# Patient Record
Sex: MALE
[De-identification: names, ages, dates, MRNs, and addresses within clinical notes are randomized per-mention and may not be internally consistent; named-entity substitution may affect disease eponyms.]

---

## 2018-04-26 ENCOUNTER — HOSPITAL ENCOUNTER (OUTPATIENT)
Dept: HOSPITAL 42 - ED | Age: 68
End: 2018-04-26
Payer: MEDICARE

## 2018-04-26 VITALS — OXYGEN SATURATION: 98 % | SYSTOLIC BLOOD PRESSURE: 161 MMHG | HEART RATE: 115 BPM | DIASTOLIC BLOOD PRESSURE: 100 MMHG

## 2018-04-26 VITALS — TEMPERATURE: 98.2 F | RESPIRATION RATE: 18 BRPM

## 2018-04-26 VITALS — BODY MASS INDEX: 27.6 KG/M2

## 2018-04-26 DIAGNOSIS — I25.10: ICD-10-CM

## 2018-04-26 DIAGNOSIS — I21.09: Primary | ICD-10-CM

## 2018-04-26 DIAGNOSIS — E78.5: ICD-10-CM

## 2018-04-26 DIAGNOSIS — Z79.84: ICD-10-CM
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
## 2018-04-26 DIAGNOSIS — Z87.442: ICD-10-CM

## 2018-04-26 DIAGNOSIS — Z87.891: ICD-10-CM

## 2018-04-26 DIAGNOSIS — E11.9: ICD-10-CM

## 2018-04-26 DIAGNOSIS — I97.710: ICD-10-CM

## 2018-04-26 DIAGNOSIS — I10: ICD-10-CM

## 2018-04-26 DIAGNOSIS — R06.6: ICD-10-CM

## 2018-04-26 LAB
ALBUMIN SERPL-MCNC: 4.1 G/DL (ref 3–4.8)
ALBUMIN/GLOB SERPL: 1.1 {RATIO} (ref 1.1–1.8)
ALT SERPL-CCNC: 38 U/L (ref 7–56)
APTT BLD: 34.1 SECONDS (ref 25.1–36.5)
AST SERPL-CCNC: 157 U/L (ref 17–59)
BASOPHILS # BLD AUTO: 0.02 K/MM3 (ref 0–2)
BASOPHILS NFR BLD: 0.1 % (ref 0–3)
BUN SERPL-MCNC: 28 MG/DL (ref 7–21)
CALCIUM SERPL-MCNC: 9.2 MG/DL (ref 8.4–10.5)
CK MB SERPL-MCNC: 12.3 NG/ML (ref 0–3.6)
CK MB%: 3 % (ref 2.5–3)
EOSINOPHIL # BLD: 0 10*3/UL (ref 0–0.7)
EOSINOPHIL NFR BLD: 0.2 % (ref 1.5–5)
ERYTHROCYTE [DISTWIDTH] IN BLOOD BY AUTOMATED COUNT: 15.9 % (ref 11.5–14.5)
GFR NON-AFRICAN AMERICAN: 43
GRANULOCYTES # BLD: 11.28 10*3/UL (ref 1.4–6.5)
GRANULOCYTES NFR BLD: 82.7 % (ref 50–68)
HGB BLD-MCNC: 14.3 G/DL (ref 14–18)
INR PPP: 1.07 (ref 0.93–1.08)
LYMPHOCYTES # BLD: 1.2 10*3/UL (ref 1.2–3.4)
LYMPHOCYTES NFR BLD AUTO: 8.6 % (ref 22–35)
MCH RBC QN AUTO: 25.7 PG (ref 25–35)
MCHC RBC AUTO-ENTMCNC: 32.9 G/DL (ref 31–37)
MCV RBC AUTO: 78.2 FL (ref 80–105)
MONOCYTES # BLD AUTO: 1.1 10*3/UL (ref 0.1–0.6)
MONOCYTES NFR BLD: 8.4 % (ref 1–6)
PLATELET # BLD: 182 10^3/UL (ref 120–450)
PMV BLD AUTO: 9 FL (ref 7–11)
PROTHROMBIN TIME: 12.3 SECONDS (ref 9.4–12.5)
RBC # BLD AUTO: 5.56 10^6/UL (ref 3.5–6.1)
TROPONIN I SERPL-MCNC: 36.4 NG/ML
WBC # BLD AUTO: 13.7 10^3/UL (ref 4.5–11)

## 2018-04-26 PROCEDURE — 85175 BLOOD CLOT LYSIS TIME: CPT

## 2018-04-26 PROCEDURE — 82550 ASSAY OF CK (CPK): CPT

## 2018-04-26 PROCEDURE — 93454 CORONARY ARTERY ANGIO S&I: CPT

## 2018-04-26 PROCEDURE — 31500 INSERT EMERGENCY AIRWAY: CPT

## 2018-04-26 PROCEDURE — 82553 CREATINE MB FRACTION: CPT

## 2018-04-26 PROCEDURE — 84484 ASSAY OF TROPONIN QUANT: CPT

## 2018-04-26 PROCEDURE — 85025 COMPLETE CBC W/AUTO DIFF WBC: CPT

## 2018-04-26 PROCEDURE — 92950 HEART/LUNG RESUSCITATION CPR: CPT

## 2018-04-26 PROCEDURE — 83615 LACTATE (LD) (LDH) ENZYME: CPT

## 2018-04-26 PROCEDURE — 93005 ELECTROCARDIOGRAM TRACING: CPT

## 2018-04-26 PROCEDURE — 99153 MOD SED SAME PHYS/QHP EA: CPT

## 2018-04-26 PROCEDURE — 96375 TX/PRO/DX INJ NEW DRUG ADDON: CPT

## 2018-04-26 PROCEDURE — 85610 PROTHROMBIN TIME: CPT

## 2018-04-26 PROCEDURE — 71045 X-RAY EXAM CHEST 1 VIEW: CPT

## 2018-04-26 PROCEDURE — 96374 THER/PROPH/DIAG INJ IV PUSH: CPT

## 2018-04-26 PROCEDURE — 99285 EMERGENCY DEPT VISIT HI MDM: CPT

## 2018-04-26 PROCEDURE — 33967 INSERT I-AORT PERCUT DEVICE: CPT

## 2018-04-26 PROCEDURE — 85730 THROMBOPLASTIN TIME PARTIAL: CPT

## 2018-04-26 PROCEDURE — 99152 MOD SED SAME PHYS/QHP 5/>YRS: CPT

## 2018-04-26 PROCEDURE — 80053 COMPREHEN METABOLIC PANEL: CPT

## 2018-04-26 NOTE — PCM.RRT
RRT Nurse Assessment





- Situation


Date: 04/26/18


Time RRT was called: 07:19 (Code Blue)


RRT Responder Arrival Time: 07:19


RRT Location:: Cath Lab


RRT Called By: RN





- IV


IV Inserted during RRT?: No


IV Fluids Initiated During RRT?: Already running IVF. It was made wide open





- Respiratory


Oxygen Delivery Method: Non Rebreather @%


Oxygen Flow Rate: 100


Was the Patient Ventilated with Bag/Mask 100% O2?: Yes


Secretions Suctioned?: Yes


Was the Patient Intubated?: No (Difficult intubation. Dr Mackay and 

Anethesiologist assisted)


Was the Patient Placed on a Ventilator?: No





- Diagnostic Test Ordered


EKG: Yes (Anterior-inferior infarction, ST elevations in V3-V5, II, III, and aVF

)


CPR started during RRT?: Yes





- Salmon Coma Scale


Coma Scale Eye Opening: No response


Coma Scale Motor: None


Coma Scale Verbal: No response





- Time RRT Ended


Time RRT Ended: 07:40





- Recommendations


Notifications: Attending Physician, Family or Designated Caregiver





I.Reason for RRT





- A) Acute Change in Patient:


Subjective: 





PGY-2 House Doc for Dr Cole





CC: Code Blue





Ms Mei, 66 yo M with PMH of DMII, HTN, and former smoker history presented to 

[facility] [place] ED with complaint of hiccups that began overnight and did not improve.  EKG 

in ED showed sinus bradycardia and anterior-inferior infarction, ST elevations 

in V3-V5 and II, III, and aVF. In the cath lab, pt received 2 Versed and 50 

fentynl. While pt was in the cath lab, it was noted his cardiac rhythm turned 

to PEA at 7:17am. CPR initiated. ACLS protocol followed. Code blue called at 7:

19a. At 7:34, intraaortic balloon pumped was in but not yet deployed. At 7:34, 

cardiac rhythm changed to V fib, delivered shock twice, but rhythm remained V 

fib. Then pt received amiodarone bolus x 1. 7 rounds of epi and 2 rounds of 

neosynephrine given. All agreed to end code at 7:40

## 2018-04-26 NOTE — CPOSTOP
DATE:  2018



CARDIAC CATH LAB POST PROCEDURE NOTE



PHYSICIAN:  Imelda Cole MD



SCRUB TECH:  Abhijit Barahona, cardiovascular technician.



TYPE OF ANESTHESIA:  Moderate conscious sedation.



PRE-PROCEDURE DIAGNOSES:  ST elevation myocardial infarction, anterior wall

myocardial infarction.



PROCEDURE PERFORMED:  Left heart catheterization, attempted percutaneous

transluminal coronary angioplasty of left anterior descending, intra-aortic

balloon pump, intubation.



FINDINGS:  RCA total, chronic occlusion, LAD apparently looks new occlusion, mid

totally occluded, circumflex diffusely diseased.



FINAL DIAGNOSIS:  Multivessel coronary artery disease.



POST PROCEDURE CONDITION:  Patient .



VASCULAR ACCESS:  Left radial for percutaneous transluminal coronary

angioplasty, right femoral for intra-aortic balloon pump.



CLOSURE DEVICE:  None.



RADIATION DOSE:  53951.6.



FLUORO TIME:  13.8 minute.







__________________________________________

Imelda Cole MD



DD:  2018 8:06:17

DT:  2018 8:47:58

Job # [id number]

MTDD

## 2018-04-26 NOTE — CON
DATE:



REASON FOR CONSULTATION:  Code STEMI, anterior wall MI.



BRIEF CLINICAL HISTORY:  This is a 67-year-old male with past medical

history diabetes more than 20 years.  He came to the Emergency Room with

complaint of hiccups.  EKG showed acute anterior wall MI, code STEMI was

activated.  I spoke to the patient.  Risks, benefits and alternatives were

discussed with the patient.  The patient agreed to proceed for cardiac

catheterization.  Discussed with the family.



PAST MEDICAL HISTORY:  Significant for diabetes.



SOCIAL HISTORY:  Denies smoking.  Denies any history of alcohol abuse.



CURRENT MEDICATIONS:  As per chart and nurses not reviewed by me.



PHYSICAL EXAMINATION:

VITAL SIGNS:  Temperature afebrile, heart rate 90, blood pressure 120/80.

HEENT:  PERRLA.  Extraocular muscles intact.

NECK:  Supple.  No carotid bruits or thyromegaly.

CHEST:  Clear to auscultation.

HEART:  S1 and S2 regular.

ABDOMEN:  Soft.

EXTREMITIES:  Clubbing and cyanosis negative.



LABORATORY DATA:  Blood workup is pending.  EKG shows normal sinus, acute

ST elevation anteriorly.



IMPRESSION:  Acute anterior wall myocardial infarction, code ST elevation

myocardial infarction.  In addition, of note, the patient  does not have

any chest pain, but have hiccups consistent with acute myocardial

infarction.



PLAN:  We will give 600 Plavix, 5000 heparin bolus, followed by  2 boluses of

 Integrellin and drop and 325 mg of aspirin.  Risks, benefits and alternatives 
were discussed with the patient.

The patient agreed to proceed for cardiac catheterization.  Further

recommendation after cardiac catheterization.  We will follow with the lab.



Thank you, Dr. Meng, for providing us the opportunity in taking care of

the patient, Jamar Mei





__________________________________________

Imelda Cloe MD



cc:  Dr. Meng



DD:  04/26/2018 8:04:11

DT:  04/26/2018 16:24:01

Job # [id number]

[id number]

## 2018-04-26 NOTE — CP.PCM.PCO
<Charlie Jackson - Last Filed: 04/26/18 06:38>





Physician Communication Note





- Physician Communication Note


Physician Communication Note: Please see attached section for Code [facility] Note





Summary





- Summary of Event


Summary of Event: 





This is a 68 yo M with PMH of DMII, HTN, and remote tobacco history who 

presented to [facility] ED with complaint of hiccups that began overnight and did not 

improve.  Pt denies any incidence of chest pain during this time, or shortness 

of breath, only complained of hiccups that persisted.  After arrival to [facility], an 

EKG was obtained as was acutely concerns for anterior-inferior infarction, due 

to lenny ST elevations in V3-V5, and less lenny but still notable ST-elevations 

in II, III, and aVF.  Cardiologist on call for [facility] was contacted by the 

ED, reviewed the EKG images, and confirmed STEMI, [facility] then called at 

0549.  Responded to the ED promptly.  Pending transfer to cath lab (awaiting 

cath team to arrive), pt received 81mg PO aspirin, loading dose of Plavix 600mg

, Integrillin bolus of 18mg IV, Heparin 5000 units SC, Metoprolol 5mg IVP, and 

a Brilinta loading dose of 180mg.  He was then started on a Nitro drip and an 

Intergillin drip.  Portable monitor with pacing pads were placed on patient's 

bed and chest (respectively), showing sinus tachycardia (consistent with rhythm 

on initial EKGs), and after receiving notification of Cath team's arrival, 

patient was transported from ED to Cath lab without issue.  Cardiologist (Dr. Cole) arrived, patient remains in stable condition at time of arrival, patient 

handed off to him.





Patient seen, reviewed, and discussed with attending, Dr. Echavarria.





<Mirian Echavarria - Last Filed: 04/26/18 20:36>





Attending/Attestation





- Attestation


I have personally seen and examined this patient.: Yes


I have fully participated in the care of the patient.: Yes


I have reviewed all pertinent clinical information: Yes


Notes (Text): 





04/26/18 06:52


Patient was seen in the ER promptly after I heard [facility] announcement.


This 67 year  old white male came to ER with complaint of hiccough.


Denies chest pain, sob, nausea, palpitation, sweating, abdominal pain, cough.


Has PMH of NIDDM, HTN, HLD, renal calculus,  quit smoking 40 years ago, smoked 3

-4 cig/day x 3-4 years, lithotripsy, occ alocohol use, family


history of D,CHF-Father,eosphageal cancer-Mother,DM-Paternal grand father.


HOME MEDS:Metformin 1000mg PO BID,Glimiperide 40 mg PO daily and more.


CRITICAL CARE TIME SPENT :60 MINUTES.

## 2018-04-26 NOTE — CARD
--------------- APPROVED REPORT --------------





EKG Measurement

Heart [id number]

CO [id number]

[id number]

[id number]

[id number]



<Conclusion>

Sinus tachycardia

Possible Left atrial enlargement

Left axis deviation

Inferior infarct, possibly acute

Anteroseptal infarct, possibly acute

Lateral injury pattern

** ** ACUTE MI ** **

Abnormal ECG

## 2018-04-26 NOTE — CP.PCM.PRO
Pronouncement of Death Note





- Clinical Findings


Physical Exam: No Response Verbal/Painful Stimuli, Absent Peripheral Pulses{

Carotid & Femoral}, Absent Heart & Breath Sounds, No Pupillary Light Reflex, No 

Corneal Reflex, Pupils Fixed & Dilated, Absence of Vital Signs





- Pronouncement Time


Time of Pronouncement of Death: 07:42





- Notifications


Pronouncement Notifications: Family Notified, Atending Notified


Medical Examiner Notified: Yes





- Autopsy


Autopsy Requested: No





- [place]


[place] [id number]

## 2018-04-26 NOTE — ED PDOC
Arrival/HPI





- General


Chief Complaint: Medical Clearance


Time Seen by Provider: 04/26/18 05:29


Historian: Patient





- History of Present Illness


Narrative History of Present Illness (Text): 


04/26/18 05:41


Patient is a 67 year old male whose past medical history includes diabetes, who 

presents to the Emergency department complaining of persistent hiccups.  

Patient reports his symptoms started at 21:00 yesterday and was not alleviated 

by various home remedies. He also experiences dyspnea as an associated symptom.

  Patient denies fevers, chills, chest pain, cough, abdominal pain, nausea, 

vomiting, diarrhea, headache, dizziness, back pain, neck pain, or any other 

complaint. 





Time/Duration: Other (Last night (21:00))


Symptom Course: Unchanged


Activities at Onset: Rest


Context: Home





Past Medical History





- Provider Review


Nursing Documentation Reviewed: Yes





- Cardiac


Hx Cardiac Disorders: Yes


Hx Hypertension: Yes





- Pulmonary


Hx Respiratory Disorders: No





- Neurological


Hx Neurological Disorder: No





- HEENT


Hx HEENT Disorder: No





- Renal


Hx Renal Disorder: No





- Endocrine/Metabolic


Hx Endocrine Disorders: Yes


Hx Diabetes Mellitus Type 2: Yes





- Hematological/Oncological


Hx Blood Disorders: No





- Integumentary


Hx Dermatological Disorder: No





- Musculoskeletal/Rheumatological


Hx Musculoskeletal Disorders: No





- Gastrointestinal


Hx Gastrointestinal Disorders: No





- Genitourinary/Gynecological


Hx Genitourinary Disorders: No





- Psychiatric


Hx Psychophysiologic Disorder: No


Hx Substance Use: No





Family/Social History





- Physician Review


Nursing Documentation Reviewed: Yes


Family/Social History: No Known Family HX


Smoking Status: Never Smoked


Hx Alcohol Use: No


Hx Substance Use: No





Allergies/Home Meds


Allergies/Adverse Reactions: 


Allergies





ofloxacin [From Floxin] Allergy (Verified 04/26/18 05:32)


 "hiccups"


Penicillins Allergy (Verified 04/26/18 05:32)


 ANAPHYLAXIS








Home Medications: 


 Home Meds











 Medication  Instructions  Recorded  Confirmed


 


Glimepiride [Glimepiride] 60 mg PO BID 10/09/16 04/26/18


 


metFORMIN [glucOPHAGE] 1,000 mg PO BID 10/09/16 04/26/18


 


Azilsartan Medoxomil [Edarbi] 40 mg PO DAILY 04/26/18 04/26/18














Review of Systems





- Physician Review


All systems were reviewed & negative as marked: Yes





- Review of Systems


Constitutional: absent: Fevers, Night Sweats


Respiratory: Other (Hiccups).  absent: SOB, Cough


Cardiovascular: absent: Chest Pain, BRADFORD


Gastrointestinal: absent: Abdominal Pain, Diarrhea, Nausea, Vomiting


Musculoskeletal: absent: Back Pain, Neck Pain


Neurological: absent: Headache, Dizziness





Physical Exam


Vital Signs Reviewed: Yes


Vital Signs











  Temp Pulse Resp BP Pulse Ox


 


 04/26/18 06:14   115 H  18  161/100 H  98


 


 04/26/18 06:07   109 H   175/116 H 


 


 04/26/18 05:29  98.2 F  122 H  18  169/101 H  96











Temperature: Afebrile


Blood Pressure: Hypertensive


Pulse: Tachycardic


Respiratory Rate: Normal


Appearance: Positive for: Well-Appearing, Non-Toxic, Comfortable


Pain Distress: None


Mental Status: Positive for: Alert and Oriented X 3





- Systems Exam


Head: Present: Atraumatic, Normocephalic


Pupils: Present: PERRL


Extroacular Muscles: Present: EOMI


Conjunctiva: Present: Normal


Mouth: Present: Moist Mucous Membranes


Neck: Present: Normal Range of Motion


Respiratory/Chest: Present: Clear to Auscultation, Good Air Exchange.  No: 

Respiratory Distress, Accessory Muscle Use


Cardiovascular: Present: Regular Rate and Rhythm, Normal S1, S2.  No: Murmurs


Abdomen: No: Tenderness, Distention, Peritoneal Signs


Back: Present: Normal Inspection


Upper Extremity: Present: Normal Inspection.  No: Cyanosis, Edema


Lower Extremity: Present: Normal Inspection.  No: Edema


Neurological: Present: GCS=15, CN II-XII Intact, Speech Normal


Skin: Present: Warm, Dry, Normal Color.  No: Rashes


Psychiatric: Present: Alert, Oriented x 3, Normal Insight, Normal Concentration





Medical Decision Making


ED Course and Treatment: 


04/26/18 05:47


Impression:


Patient is a 67 year old male with persistent hiccups since 21:00.present with 

acute anterior wall mi





Differential Diagnosis included but are not limited to:  STEMI





Plan:


-- EKG


-- labs, cardiac enzymes


-- chest X-ray


-- Reassess and disposition





Progress Notes:


4/26/18 5:46 


EKG shows sinus tachycardia at 119 BPM with anterior wall MI. Interpreted by 

me. 





04/26/18 05:48 


Code heart activated





4/26/18 5:51 


Discussed case with Dr. Cole (interventional cardiologist), who will review 

EKG.  





4/26/18 5:52 


Spoke again with  who reviewed EKG and states patient will be taken to 

cath lab, and recommends integrilin bolus, Brilinta, and Heparin.





04/26/18 06:15


Chest X-ray negative with no acute processes. Interpreted by me.





04/26/18 06:17


Pt taken to cath lab  with house doctor at bedside.


04/27/18 08:36








- Critical Care


Critical Care Minutes: 30 minutes


Narrative Critical Care (Text): 


management of STEMI





- Lab Interpretations


Lab Results: 








 04/26/18 05:53 





 04/26/18 05:53 





 Lab Results





04/26/18 05:53: Sodium 134, Potassium 4.0, Chloride 98, Carbon Dioxide 24, 

Anion Gap 17, BUN 28 H, Creatinine 1.6 H, Est GFR ( Amer) 53, Est GFR (

Non-Af Amer) 43, Random Glucose 310 H*, Calcium 9.2, Total Bilirubin 0.9, AST 

157 H D, ALT 38, Alkaline Phosphatase 95, Lactate Dehydrogenase 2060 H, Total 

Creatine Kinase 404 H, CK-MB (CK-2) 12.3 H, CK-MB (CK-2) % 3.0, Troponin I 

36.40 H*, Total Protein 7.8, Albumin 4.1, Globulin 3.8, Albumin/Globulin Ratio 

1.1


04/26/18 05:53: PT 12.3, INR 1.07, APTT 34.1


04/26/18 05:53: WBC 13.7 H, RBC 5.56, Hgb 14.3, Hct 43.5, MCV 78.2 L, MCH 25.7, 

MCHC 32.9, RDW 15.9 H, Plt Count 182, MPV 9.0, Gran % 82.7 H, Lymph % (Auto) 

8.6 L, Mono % (Auto) 8.4 H, Eos % (Auto) 0.2 L, Baso % (Auto) 0.1, Gran # 11.28 

H, Lymph # (Auto) 1.2, Mono # (Auto) 1.1 H, Eos # (Auto) 0.0, Baso # (Auto) 0.02











- RAD Interpretation


Radiology Orders: 








04/26/18 05:52


CHEST PORTABLE [RAD] Stat 











: ED Physician





- EKG Interpretation


Interpreted by ED Physician: Yes


Type: 12 lead EKG





- Medication Orders


Current Medication Orders: 











Discontinued Medications





Aspirin (Ecotrin)  81 mg PO STAT STA


   Stop: 04/26/18 05:52


   Last Admin: 04/26/18 06:01  Dose: 81 mg





Clopidogrel Bisulfate (Plavix)  600 mg PO STAT STA


   Stop: 04/26/18 05:52


   Last Admin: 04/26/18 06:01  Dose: 600 mg





Eptifibatide (Integrilin Bolus)  18 mg 0.18 mg/kg (18 mg) IV ONCE ONE


   Stop: 04/26/18 05:52


   Last Admin: 04/26/18 06:04  Dose: 18 mg





eMAR Start Stop


 Document     04/26/18 06:04  AD  (Rec: 04/26/18 06:29  AD  [id number])


     Intravenous Solution


      Start Date                                 04/26/18


      Start Time                                 06:04





Heparin Sodium (Porcine) (Heparin)  5,000 units IVP STAT STA


   PRN Reason: Protocol


   Stop: 04/26/18 06:02


   Last Admin: 04/26/18 06:06  Dose: 5,000 units





IVP Administration


 Document     04/26/18 06:06  AD  (Rec: 04/26/18 06:30  AD  [id number])


     Charges for Administration


      # of IVP Administrations                   1





Eptifibatide (Integrilin)  75 mg in 100 mls @ 15.966 mls/hr IV .Q6H16M YULIA; 2 

MCG/KG/MIN


   PRN Reason: Protocol


Nitroglycerin/Dextrose (Nitroglycerin 50 Mg/250 Ml D5w)  50 mg in 250 mls @ 1.5 

mls/hr IV .Q24H PRN; Protocol; 5 MCG/MIN


   PRN Reason: Systolic Blood Pressure


Metoprolol Tartrate (Lopressor)  5 mg IVP STAT STA


   Stop: 04/26/18 05:52


   Last Admin: 04/26/18 06:07  Dose: 5 mg





IVP Administration


 Document     04/26/18 06:07  AD  (Rec: 04/26/18 06:30  AD  [id number])


     Charges for Administration


      # of IVP Administrations                   1


MAR Pulse and Blood Pressure


 Document     04/26/18 06:07  AD  (Rec: 04/26/18 06:30  AD  [id number])


     Pulse


      Pulse Rate (60-90)                         109


     Blood Pressure


      Blood Pressure (100//90)             175/116





Ticagrelor (Brilinta)  180 mg PO STAT STA


   Stop: 04/26/18 06:03


   Last Admin: 04/26/18 06:13  Dose: 180 mg











- Scribe Statement


The provider has reviewed the documentation as recorded by the Scribe





[employer] Under Shruti Vasquez


Provider Scribe Attestation:


All medical record entries made by the Scribe were at my direction and 

personally dictated by me. I have reviewed the chart and agree that the record 

accurately reflects my personal performance of the history, physical exam, 

medical decision making, and the department course for this patient. I have 

also personally directed, reviewed, and agree with the discharge instructions 

and disposition.








Disposition/Present on Arrival





- Present on Arrival


Any Indicators Present on Arrival: No


History of DVT/PE: No


History of Uncontrolled Diabetes: No


Urinary Catheter: No


History of Decub. Ulcer: No


History Surgical Site Infection Following: None





- Disposition


Have Diagnosis and Disposition been Completed?: Yes


Diagnosis: 


 Anterior wall myocardial infarction





Disposition: HOSPITALIZED


Disposition Time: 06:20


Condition: CRITICAL

## 2018-05-01 NOTE — CARD
--------------- APPROVED REPORT --------------





Procedure(s) performed: Left Heart Catheterization

PTCA with Stenting attempted

CPR

Intra aotic baloon pupm placed



HISTORY

The patient is a 67 year-old male with a history of : diabetes 

mellitus with oral treatment , tobacco history() : The patient is a 

former smoker , hypertension , admitted with STEMI.



INDICATION

The indication(s) include : STEMI .



CASE TECHNIQUE

The patient was brought emergently to the Cardiac Catheterization 

Laboratory in a fasting state and was prepped and draped in a sterile 

manner. The left wrist was infiltrated with 2% Lidocaine subcutaneous 

anesthesia. A sheath was inserted into the left radial artery without 

difficulty. Coronary angiography was performed using coronary 

diagnostic catheters. The left coronary system was accessed and 

visualized with a Diagnostic ,6 Fr JL 4 catheter. The right coronary 

system was accessed and visualized with a Diagnostic ,6 Fr JR 3.5 

catheter.



Vessel Analysis

The patient's coronary anatomy is co-dominant.



The left main coronary artery is a medium size vessel with diffuse 

calcification noted throughout this vessel and without significant 

stenosis. The left main trifurcates to the left anterior descending, 

circumflex, and ramus.



The left anterior descending artery is a medium size vessel with 

diffuse calcification noted throughout this vessel and with 

significant stenosis. There is a 100% stenosis in the mid segment. 

The first diagonal branch is a small size vessel with diffuse 

calcification noted throughout this vessel and without significant 

stenosis. 



The circumflex artery is a large size vessel with diffuse 

calcification noted throughout this vessel and with significant 

stenosis. There is a 60-70% stenosis in the distal segment. The first 

obtuse marginal branch is a small size vessel with diffuse 

calcification noted throughout this vessel and with significant 

stenosis. The second obtuse marginal branch is a medium size vessel 

with diffuse calcification noted throughout this vessel and with 

significant stenosis. The third obtuse marginal branch is a medium 

size vessel with diffuse calcification noted throughout this vessel 

and without significant stenosis. The left posterior descending 

artery is a medium size vessel with diffuse calcification noted 

throughout this vessel and without significant stenosis. 



The ramus intermedius artery is a medium size vessel with diffuse 

calcification noted throughout this vessel and without significant 

stenosis. 



The right coronary artery is a medium size vessel with diffuse 

calcification noted throughout this vessel and with significant 

stenosis. There is a 100% stenosis in the proximal segment.  

Collateralzed from Left system, Cx



PCI Technique Lesion

Anticoagulation was achieved with Heparin. Percutaneous coronary 

intervention was performed on the mid left anterior descending artery 

segment. The lesion stenosis prior to intervention was 100% with BOOGIE 

0 flow. A 6 Fr XB 3.5 Guide Catheter was used to engage the ostium. A 

Luge 182 Interventional Guidewire was used to cross the lesion.



BALLOON DILATION

A Balloon catheter 2.0 x 12mm Sprinter OTW was inserted and inflated 

up to jossie for seconds. Then exchange with long 300 cm choice PT



Conclusion

Auute Code STEMI

Severe triple vessel Diz.

RCA 

CX codominant Diffusely diseased, MultipleOM branches are Severely 

diffused,  but small calibre vessels

% in mid segment, thought to be culprit for this Event.

PTCA of LAD attempted, first tried to cross with Ludge wire, then 

exchange to choice PT long 300cm with Over the wire

balloon and after difficulty successfully crossed the lesionand then 

pulled the wire and injected contrast tthrough the balloon

 lumen and found ballonn in true lumen.

 At that time I decided to take aspiration catheter to suck blood 

clot from occluded LAD, so while exchanging OTW balloon to suction

 catheter( pronto), pt went into PEA.

ACLS protocol carried out, CPR done, intubated, and IABP placed,but 

pt went into Vfib mutiple times DC cardioversion

done but could not be revived, Resuscitaed for almost half an hour,

ultimately pronounced DEAD around 7:45 am,

 family and Medical examiner were notified.

## 2023-08-03 NOTE — CARDCATH
PROCEDURE DATE:  04/26/2018



CARDIAC CATH LAB PROCEDURE/ANGIOPLASTY



PROCEDURES PERFORMED:  Left heart catheterization, angioplasty of LAD.



FIRST ASSISTANT:  Diana Barahona, cardiovascular technician.



SCHEDULE:  Emergent.



BRIEF CLINICAL HISTORY:  This is a 67-year-old male with past medical

history of diabetes for 20 years, came in with complaints of hiccup in

emergency room, found to be ST elevation in anterior lead.  The patient on

emergent basis taken to the cath lab.  Risk and benefits were discussed

with the patient.  Left radial access was obtained and cardiac

catheterization was done.



FINDINGS:  Left main essentially free of significant disease, bifurcate LAD

and circumflex LAD, mid totally occluded, diffuse disease proximal to mid

and mid LAD totally occluded, possibly thought to be the culprit for acute

MI.  Circumflex is diffusely diseased, but no flow limiting stenosis noted.

Right coronary artery, total [id number] occlusion long segment proximal to

mid to distal segment.



In view of above, PTCA of LAD was contemplated.  The patient prior to

coming to the cath lab got 600 mg Plavix, Integrilin single bolus drip was

started, 1 more bolus was given and 5000 heparin was given.  ACT was

checked, found to be 222 seconds. XB 3.5 guider was taken to engage the

coronary and then lesion crossed with regular Luge wire and then put long

exchange Choice PT and all the while balloon injection was then found

distal LAD.  I was able to open up to distal LAD and then at that time plan

was put a suction catheter to suck the blood clot while the catheter was

exchanging from regular balloon to suction catheter.  The patient become

asystole with pulseless electrical activity.  CPR started.  During this,

the patient was intubated and intraaortic balloon pump was placed and after

30 minutes of resuscitation, the patient did not revive and pronounced dead

around 7:45 .  Detailed pronouncement of death as per , resident. 

Family was notified.  ME was notified and he may release the body.





__________________________________________

Imelda Cole MD



DD:  04/26/2018 14:57:41

DT:  04/26/2018 16:54:19

Job # [id number]

[facility] IslandNAJMA Shanti Gayle RN, CM